# Patient Record
(demographics unavailable — no encounter records)

---

## 2025-06-02 NOTE — IMAGING
[de-identified] :  RIGHT KNEE Inspection:  minimal effusion Palpation: medial facet of the patella tenderness  Knee Range of Motion:  0-135 Strength: 5/5 Quadriceps strength, 5/5 Hamstring strength, 4/5 Hip Abductor strength Neurological: light touch is intact throughout Ligament Stability and Special Tests:  McMurrays: neg Lachman: neg Pivot Shift: neg Posterior Drawer: neg Valgus: neg Varus: neg Patella Apprehension: neg Patella Maltracking: neg

## 2025-06-02 NOTE — HISTORY OF PRESENT ILLNESS
[de-identified] : 19 year old male  ([employer] [facility] [employer] , rec sports )  chronic knee pain since summer 2024, worsening since May 2025   The pain is located  ant and deep The pain is associated with  clicking and swellikng Worse with activity and better at rest. Has tried ice, activity mod 
Abdominal Pain, N/V/D